# Patient Record
Sex: FEMALE | Race: WHITE | NOT HISPANIC OR LATINO | Employment: OTHER | URBAN - METROPOLITAN AREA
[De-identification: names, ages, dates, MRNs, and addresses within clinical notes are randomized per-mention and may not be internally consistent; named-entity substitution may affect disease eponyms.]

---

## 2023-01-07 ENCOUNTER — HOSPITAL ENCOUNTER (EMERGENCY)
Facility: HOSPITAL | Age: 73
Discharge: HOME | End: 2023-01-07
Attending: EMERGENCY MEDICINE
Payer: MEDICARE

## 2023-01-07 VITALS
HEART RATE: 54 BPM | SYSTOLIC BLOOD PRESSURE: 168 MMHG | OXYGEN SATURATION: 100 % | RESPIRATION RATE: 16 BRPM | HEIGHT: 64 IN | DIASTOLIC BLOOD PRESSURE: 82 MMHG | BODY MASS INDEX: 23.05 KG/M2 | TEMPERATURE: 99 F | WEIGHT: 135 LBS

## 2023-01-07 DIAGNOSIS — M54.41 ACUTE MIDLINE LOW BACK PAIN WITH RIGHT-SIDED SCIATICA: ICD-10-CM

## 2023-01-07 DIAGNOSIS — S22.088A OTHER CLOSED FRACTURE OF TWELFTH THORACIC VERTEBRA, INITIAL ENCOUNTER: Primary | ICD-10-CM

## 2023-01-07 DIAGNOSIS — M25.559 HIP PAIN: ICD-10-CM

## 2023-01-07 PROCEDURE — 25000003 PHARM REV CODE 250: Performed by: NURSE PRACTITIONER

## 2023-01-07 PROCEDURE — 99285 EMERGENCY DEPT VISIT HI MDM: CPT | Mod: 25

## 2023-01-07 PROCEDURE — 63600175 PHARM REV CODE 636 W HCPCS: Performed by: NURSE PRACTITIONER

## 2023-01-07 PROCEDURE — 96372 THER/PROPH/DIAG INJ SC/IM: CPT | Performed by: NURSE PRACTITIONER

## 2023-01-07 RX ORDER — HYDROCODONE BITARTRATE AND ACETAMINOPHEN 5; 325 MG/1; MG/1
1 TABLET ORAL EVERY 8 HOURS PRN
Qty: 12 TABLET | Refills: 0 | Status: SHIPPED | OUTPATIENT
Start: 2023-01-07

## 2023-01-07 RX ORDER — HYDROCODONE BITARTRATE AND ACETAMINOPHEN 10; 325 MG/1; MG/1
1 TABLET ORAL
Status: COMPLETED | OUTPATIENT
Start: 2023-01-07 | End: 2023-01-07

## 2023-01-07 RX ORDER — METHOCARBAMOL 500 MG/1
1000 TABLET, FILM COATED ORAL
Status: COMPLETED | OUTPATIENT
Start: 2023-01-07 | End: 2023-01-07

## 2023-01-07 RX ORDER — KETOROLAC TROMETHAMINE 30 MG/ML
15 INJECTION, SOLUTION INTRAMUSCULAR; INTRAVENOUS
Status: COMPLETED | OUTPATIENT
Start: 2023-01-07 | End: 2023-01-07

## 2023-01-07 RX ADMIN — HYDROCODONE BITARTRATE AND ACETAMINOPHEN 1 TABLET: 10; 325 TABLET ORAL at 10:01

## 2023-01-07 RX ADMIN — KETOROLAC TROMETHAMINE 15 MG: 30 INJECTION, SOLUTION INTRAMUSCULAR; INTRAVENOUS at 11:01

## 2023-01-07 RX ADMIN — METHOCARBAMOL 1000 MG: 500 TABLET ORAL at 11:01

## 2023-01-07 NOTE — ED PROVIDER NOTES
Encounter Date: 1/7/2023       History     Chief Complaint   Patient presents with    Hip Pain     Patient reports right hip pain radiating down her leg that started yesterday, hx of back surgery, denies injury     72 y.o. White female with a history of osteoporosis, hypertension, and hyperlipidemia presents to Emergency Department with a chief complaint of back pain. Symptoms began yesterday and have been constant since onset. Associated symptoms include R hip pain. Symptoms are aggravated with palpation and exertion and there are no alleviating factors. The patient denies recent injury, CP, SOB, weakness, fever, or vomiting. No other reported symptoms at this time.       The history is provided by the patient. No  was used.   Hip Pain  This is a new problem. The current episode started yesterday. The problem occurs daily. The problem has not changed since onset.Pertinent negatives include no chest pain, no abdominal pain, no headaches and no shortness of breath. The symptoms are aggravated by exertion. She has tried nothing for the symptoms.   Back Pain   This is a recurrent problem. The current episode started yesterday. The problem occurs throughout the day. The problem has been unchanged. The pain is associated with no known injury. The pain is present in the lumbar spine. The pain radiates to the right leg. The symptoms are aggravated by certain positions, twisting and bending. The pain is The same all the time. Stiffness is present All day. Pertinent negatives include no chest pain, no fever, no headaches, no abdominal pain, no abdominal swelling, no bowel incontinence, no perianal numbness, no bladder incontinence, no dysuria, no paresthesias, no paresis, no tingling and no weakness. She has tried nothing for the symptoms. Risk factors include a history of osteoporosis.   Review of patient's allergies indicates:   Allergen Reactions    Darvocet a500 [propoxyphene n-acetaminophen]       Past Medical History:   Diagnosis Date    GERD (gastroesophageal reflux disease)     HTN (hypertension)     Mixed hyperlipidemia     Osteoporosis      Past Surgical History:   Procedure Laterality Date    BACK SURGERY      CHOLECYSTECTOMY      FOOT SURGERY Left     HAND SURGERY Right     HYSTERECTOMY       No family history on file.     Review of Systems   Constitutional:  Negative for chills, fatigue and fever.   Eyes:  Negative for photophobia and visual disturbance.   Respiratory:  Negative for shortness of breath, wheezing and stridor.    Cardiovascular:  Negative for chest pain and leg swelling.   Gastrointestinal:  Negative for abdominal pain, bowel incontinence, nausea and vomiting.   Genitourinary:  Negative for bladder incontinence, difficulty urinating, dysuria, flank pain and frequency.   Musculoskeletal:  Positive for arthralgias and back pain. Negative for joint swelling, neck pain and neck stiffness.   Neurological:  Negative for dizziness, tingling, seizures, weakness, headaches and paresthesias.   All other systems reviewed and are negative.    Physical Exam     Initial Vitals [01/07/23 1032]   BP Pulse Resp Temp SpO2   133/76 84 18 98.8 °F (37.1 °C) 100 %      MAP       --         Physical Exam    Nursing note and vitals reviewed.  Constitutional: She appears well-developed and well-nourished. She is not diaphoretic. She is cooperative.  Non-toxic appearance. No distress.   HENT:   Head: Normocephalic and atraumatic.   Right Ear: External ear normal.   Left Ear: External ear normal.   Nose: Nose normal.   Mouth/Throat: Oropharynx is clear and moist. No oropharyngeal exudate.   Eyes: Conjunctivae and EOM are normal. Pupils are equal, round, and reactive to light. Right eye exhibits no discharge. Left eye exhibits no discharge.   Neck: Neck supple. No thyromegaly present. No tracheal deviation present. No JVD present.   Normal range of motion.  Cardiovascular:  Normal rate, regular rhythm, normal  heart sounds and intact distal pulses.           Pulmonary/Chest: Effort normal and breath sounds normal. No respiratory distress. She has no wheezes. She exhibits no tenderness.   Abdominal: Abdomen is soft. Bowel sounds are normal. She exhibits no distension. There is no abdominal tenderness. There is no guarding.   Musculoskeletal:         General: Tenderness present. No edema. Normal range of motion.      Cervical back: Normal, normal range of motion and neck supple.      Thoracic back: Normal.      Lumbar back: Tenderness and bony tenderness present. No swelling or edema.      Right hip: Tenderness present. No deformity. Normal strength.      Left hip: Normal.      Comments: Tenderness noted to R hip and lumbar region of back. Patient has old midline lumbar surgical scar that is well approximated from previous back surgery. Full 5/5 ROM noted to all 4 extremities. All other adjacent joints otherwise normal.        Neurological: She is alert and oriented to person, place, and time. She has normal strength. No sensory deficit. GCS score is 15. GCS eye subscore is 4. GCS verbal subscore is 5. GCS motor subscore is 6.   Skin: Skin is warm and dry. Capillary refill takes less than 2 seconds. No rash noted. No erythema.   Psychiatric: She has a normal mood and affect. Thought content normal.       ED Course   Procedures  Labs Reviewed - No data to display       Imaging Results              X-Ray Hip 2 or 3 views Right (with Pelvis when performed) (Final result)  Result time 01/07/23 13:02:41      Final result by Van Vergara MD (01/07/23 13:02:41)                   Impression:      No acute findings.      Electronically signed by: Van Vergara  Date:    01/07/2023  Time:    13:02               Narrative:    EXAMINATION:  XR HIP WITH PELVIS WHEN PERFORMED, 2 OR 3  VIEWS RIGHT    CLINICAL HISTORY:  Pain in unspecified hip    COMPARISON:  None    FINDINGS:  Frontal view of the pelvis with two views of the right hip.   There is no fracture or dislocation.  There is no significant joint space narrowing.  Suspect a prominent phlebolith right hemipelvis.  Partially imaged lumbar spine fusion hardware.                                       CT Lumbar Spine Without Contrast (Final result)  Result time 01/07/23 12:48:09      Final result by Dandy Morris MD (01/07/23 12:48:09)                   Impression:      Status post fusion at L4-5 without evidence for hardware complication.  Multilevel spondylotic changes are identified with central canal stenosis as above.  Age-indeterminate compression deformity of the T12 vertebral body.      Electronically signed by: Dandy Morris MD  Date:    01/07/2023  Time:    12:48               Narrative:    EXAMINATION:  CT LUMBAR SPINE WITHOUT CONTRAST    CLINICAL HISTORY:  Compression fracture, lumbar;    TECHNIQUE:  Low-dose axial, sagittal and coronal reformations are obtained through the lumbar spine.  Contrast was not administered.  An automated dose exposure technique was utilized.  This limits radiation does the patient.    COMPARISON:  01/07/2023 .    FINDINGS:  5 lumbar type vertebral bodies. Normal lordotic curvature is identified. Grade 1 anterolisthesis of L4 on L5 with prior posterior fusion at this level.  No overt evidence for hardware complication or hardware loosening.  Likely prior bilateral pars defect with sclerosis is identified at this level. An age-indeterminate compression deformity of the T12 vertebral body is identified with deformity of the superior endplate. Minimal retropulsion is identified.  The remaining vertebral heights are maintained with likely Schmorl's node along the inferior endplate of L1. Diffuse osteopenia without overt evidence for fracture deformity.  Intervertebral disc space narrowing is identified with disc desiccation.    Moderate central canal stenosis is identified at L1-L2 with severe central canal stenosis at L3-L4.  Prior laminotomy defects are  identified at L4-5 and L5-S1.  Multilevel neural foraminal narrowing is identified.    The visualized hollow and solid viscera are grossly normal with diffuse atheromatous disease of the abdominal aorta.  Degenerative changes of the SI joints are noted.                                       X-Ray Lumbar Spine Ap And Lateral (Final result)  Result time 01/07/23 11:31:19      Final result by Van Vergara MD (01/07/23 11:31:19)                   Impression:      Compression deformities of T12 and L1 are age indeterminate.  Degenerative and postsurgical changes elsewhere.      Electronically signed by: Van Vergara  Date:    01/07/2023  Time:    11:31               Narrative:    EXAMINATION:  XR LUMBAR SPINE AP AND LATERAL    CLINICAL HISTORY:  low back pain;    COMPARISON:  None    FINDINGS:  Frontal and lateral views of the lumbar spine.  There is minimal levoscoliosis centered L2-L3.  On the lateral view there is grade 1 anterolisthesis L4 on L5 likely secondary to degenerative changes.  There are posterior fusion changes L4-L5.  There is moderate superior endplate compression deformity T12.  Milder superior endplate compression deformity L1.  These are age indeterminate.  There are moderate degenerative changes.  There is aortic atherosclerosis.  There are cholecystectomy clips.                                       Medications   methocarbamoL tablet 1,000 mg (1,000 mg Oral Given 1/7/23 1100)   HYDROcodone-acetaminophen  mg per tablet 1 tablet (1 tablet Oral Given 1/7/23 1045)   ketorolac injection 15 mg (15 mg Intramuscular Given 1/7/23 1103)     Medical Decision Making:   Initial Assessment:   Female patient presents to ER c/o R hip pain and lower back pain. Patient tender to lumbar region of back and R hip. Reports lumbar back pain radiates to her R leg. Full 5/5 ROM noted. Patient awake, alert, has non-labored breathing, and is answering questions appropriately.   Differential Diagnosis:   Back Pain,  Hip Pain, Vertebral Fracture   Clinical Tests:   Radiological Study: Ordered and Reviewed  ED Management:  Co-morbidities and/or factors adding to the complexity or risk for the patient?: Osteoporosis and previous back surgery on L4 & L5.   Differential diagnoses: Back Pain, Hip pain, Vertebral Fracture   Decision to obtain previous or outside records?: I did not obtain previous or outside records.  Chart Review (nursing home, outside records, CareEverywhere): none  Review of RX medications/new RX prescribed by me?: Patient currently takes Xanax, Alendronate, Lipitor, Metoprolol, Effexor, and Omeprazole. Prescribed Norco for pain; cautioned patient on side effects and instructed her not to take medication with Xanax. Discussed fall prevention as well.   Labs/imaging/other tests obtained/considered (risk/benefits of testing discussed): Obtained lumbar xray. Once lumbar xray resulted; ordered CT of lumbar back and R hip xray.   Labs/tests intepretation: Lumbar xray- Chronic degenerative changes & possible T12/L1 fracture. CT lumbar-  Status post fusion at L4-5 without evidence for hardware complication with age indeterminate T12 fracture. Hip xray- negative   My independent imaging interpretation: none  Treatment/interventions, IV fluids, IV medications: Robaxin, Norco, and Toradol ordered in ER for pain. TLSO brace placed by  in ER for T12 fracture.   Complex management (IV controlled substances, went to OR, DNR, meds requiring monitoring, transfer, etc)?: none  Workup/treatment affected by social determinants of health?:none   Point of care US done/interpretation: none  Consults/radiologist/EMS/social work/family discussion/alternate history: none  Advanced care planning/end of life discussion: none  Shared decision making: Discussed plan of care with patient and family. Aware and agreed with plan of care. Patient will follow up with  PCP and Dr. Xiao (since already established with him) regarding T12  fracture. No questions.   ETOH/smoking/drug cessation discussion: none  Dispo: Patient discharged home. Patient denies new or additional complaints; no further tests indicated at this time. Verbalized understanding of instructions. No emergent or apparent distress noted prior to discharge. To follow up with PCP in 1 week as needed.                ED Course as of 01/07/23 1433   Sat Jan 07, 2023   1319  contacted regarding TLSO brace.  [JA]   1410 TLSO brace in place; Full 5/5 ROM noted to all 4 extremities. [JA]      ED Course User Index  [JA] Yara Macedo NP                 Clinical Impression:   Final diagnoses:  [M25.559] Hip pain  [S22.088A] Other closed fracture of twelfth thoracic vertebra, initial encounter (Primary)  [M54.41] Acute midline low back pain with right-sided sciatica        ED Disposition Condition    Discharge Stable          ED Prescriptions       Medication Sig Dispense Start Date End Date Auth. Provider    HYDROcodone-acetaminophen (NORCO) 5-325 mg per tablet Take 1 tablet by mouth every 8 (eight) hours as needed for Pain. 12 tablet 1/7/2023 -- Yara Macedo NP          Follow-up Information       Follow up With Specialties Details Why Contact Info    Maribel Nelson NP Family Medicine Call in 1 week If symptoms worsen, As needed [address] [facility] [facility] [address]  [phone]      [facility] - Emergency Dept Emergency Medicine Go to  If symptoms worsen, As needed [address] [facility] [id number] [phone]    Gustavo Xiao MD Neurosurgery Call   [address] [facility] [address] [phone]               Yara Macedo NP  01/07/23 1435

## 2023-01-07 NOTE — DISCHARGE INSTRUCTIONS
Do not take Norco and Xanax together.     Patient has T12 fracture.    Thanks for letting us take care of you today!  It is our goal to give you courteous care and to keep you comfortable and informed, if you have any questions before you leave I will be happy to try and answer them.    Here is some advice after your visit:      Your visit in the emergency department is NOT definitive care - please follow-up with your primary care doctor and/or specialist within 1 week.  Please return if you have any worsening in your condition or if you have any other concerns.    If you had radiology exams like an XRAY or CT in the emergency Department the interpreation on them may be preliminary - there may be less time sensitive findings on the reports please obtain these reports within 24 hours from the hospital or by using your out on your mobile phone to access records.  Bring these to your primary care doctor and/or specialist for further review of incidental findings.    Please review any LAB WORK from your visit today with your primary care physician.    If you were prescribed OPIATE PAIN MEDICATION - please understand of these medications can be addictive, you may fill less of the prescription was written for, you do not have to take the full prescription.  You may discard what you do not use.  Please seek help if you feel you are having problems with addiction.  Do not drive or operate heavy machinery if you are taking sedating medications.  Do not mix these medications with alcohol.